# Patient Record
(demographics unavailable — no encounter records)

---

## 2019-02-07 NOTE — RAD
CHEST ONE VIEW:

 

History: 

Cough. 

 

Comparison: 

4-29-17

 

FINDINGS: 

There is some rotation to the left. There is deformity of the proximal left humerus and glenohumeral 
joint which is old. There are some increased pleural and parenchymal opacity changes in the lung base
s bilaterally, some of this may be related to less inspiratory effort on today's study. However, mini
mal early pneumonia, particularly in the right base, is a consideration. The left costophrenic angle 
region appears stable when compared to a prior 1-2-19 CT abdomen and pelvis  film. 

 

IMPRESSION: 

Pleural and parenchymal opacity changes in the bases which appear more prominent than on prior study.
 This is concerning for the possibility of some acute right lower lobe pneumonia and/or atelectasis. 
The left costophrenic angle opacity appears stable from prior CT, 1-2-19.

 

POS: TPC

## 2019-02-07 NOTE — HP
PRIMARY CARE PROVIDER:  Dr. Crispin Powell. 



The patient referred to [facility] by [facility]

for pneumonia. 



CHIEF COMPLAINT:  Miserable.



HISTORY OF PRESENT ILLNESS:  The patient states she has cough, almost continues,

coughing up clear to slightly off color phlegm, shortness of breath, some chills,

and non-documented fever.  No chest pain.  Chest x-ray confirms a complex pneumonia,

bilateral. 



PAST MEDICAL HISTORY:  Hypertension, diabetes mellitus type 2, dyslipidemia,

coronary artery disease, post coronary artery bypass graft greater than 10 years

ago, seizure disorder, post brain abscess with craniotomy maybe 20 years ago. 



ALLERGIES:  CIPRO, CODEINE, AND DILANTIN.  SHE HAS HAD PENICILLIN ONCE AS A CHILD,

IT CAUSED RASH.  SULFA, TEGRETOL, AND VANCOMYCIN.  I SPOKE TO HER ABOUT CEPHALEXIN,

SHE HAS TAKEN THAT WITHOUT DIFFICULTY. 



CURRENT MEDICATIONS:  Aspirin 81 mg a day, Plavix 75 mg a day, Benicar 40 mg a day,

Crestor 40 mg a day, metoprolol 50 mg p.o. b.i.d., phenobarbital 64.8 mg twice a

day, metformin 500 mg once a day, glimepiride 1 mg a day, Januvia 25 mg a day,

Myrbetriq ER 50 mg a day, Travatan eye drops, Tylenol, and torsemide 20 mg a day. 



FAMILY HISTORY:  Father and grandfather  with diabetes and hypertension.



SOCIAL HISTORY:  .  Nonsmoker.  Nondrinker.  DNAR status confirmed with the

patient and Carlos Aguilar, her daughter, who has power of . 



REVIEW OF SYSTEMS:  GENERAL:  No headaches, dizziness, or fainting. 

EYES:  No double vision, blurred vision, or flashing lights. 

EAR, NOSE, AND THROAT:  No ear pain or drainage.  No nasal bleeding.  No trouble

swallowing. 

CARDIAC:  No chest pain, orthopnea, or paroxysmal nocturnal dyspnea. 

RESPIRATIONS:  See present illness.  No asthma history. 

GASTROINTESTINAL:  No nausea, vomiting, diarrhea, or abdominal pain. 

GENITOURINARY:  No hematuria or dysuria. 

MUSCULOSKELETAL:  She states her ankles swell occasionally.  No particular muscle or

joint pains. 

NEUROLOGICAL:  She has no residual weakness, etc, from her brain abscess and

craniotomy. 

PSYCHIATRIC:  No anxiety or depression. 

SKIN:  No bruises.  She has easy bruising.  No rash. 

HEME/LYMPH:  No tender or swollen lymph nodes in the axilla, inguinal, or cervical

area. 



PHYSICAL EXAMINATION:

GENERAL:  She is alert, pleasant, oriented, and cooperative lady. 

VITAL SIGNS:  Blood pressure 168/78, pulse 104, respirations 20, temperature 97, and

pulse ox 95 on room air. 

HEENT:  Examination of her head, eyes, ears, nose, and throat revealed pupils are

equal and round.  Extraocular movements are intact.  Sclerae are white.  Tympanic

membranes clear.  Nose clear.  Oral mucous membranes are wet.  Dental hygiene is

adequate. 

NECK:  Supple without jugular venous distention, adenopathy, or thyromegaly. 

CHEST:  Clear to percussion.  She has rales in the left lower lung field and scant

rales in the mid right chest.  Breath sounds are good without wheezes. 

HEART:  Regular rate and rhythm.  First and second heart sounds clear.  No murmurs

or gallops. 

ABDOMEN:  Soft.  Bowel sounds are normal.  No hepatosplenomegaly.  No mass.  No

rebound. 

EXTREMITIES:  Demonstrate no cyanosis, clubbing, or edema.  Pulses; carotid, radial,

femoral, and dorsalis pedis pulses intact. 

SKIN:  Warm and dry without significant ecchymoses or rash. 

HEME/LYMPH:  No tender or swollen lymph nodes in axilla, inguinal, or cervical area.

 No petechial hemorrhages in her mucous membranes or nail beds. 

NEUROLOGIC:  Cranial nerves II through XII intact.  Deep tendon reflexes symmetric.

Moves all extremities.  Sensations intact. 



IMAGING DATA:  Chest x-ray; no cardiomegaly, bilateral infiltrates, left lateral

lower lobe, and right perihilar area, reviewed by me.  EKG; sinus tachycardia and

minimal ST depression in lateral leads, reviewed by me. 



LABORATORY DATA:  Comprehensive metabolic profile; CO2 of 20, lactic acid 1.7, blood

sugar 350, creatinine 2.05, BUN 45, sodium 138, potassium 4.4, and chloride 106.

Liver function tests normal.  She does have an elevated troponin at 0.16.  White

count 10.5, hemoglobin 10.8, and platelet count 180,000. 



ADMITTING DIAGNOSES:  

1. Pneumonia, complex, bilateral.

2. Diabetes mellitus type 2 with elevated blood sugar and chronic kidney disease

stage 3. 

3. Coronary artery disease.

4. Hypertension.

5. Seizure disorder, controlled.

6. Elevated troponin, most likely due to chronic kidney disease.



PLAN:  

1. Cultures have been obtained.  Rocephin and Zithromax will be given IV daily.

2. Mucinex DM twice a day.

3. Accu-Cheks and sliding scale.  Continue oral hypoglycemic agents.

4. Continue aspirin, Plavix, metoprolol, torsemide, and Benicar.  Expected length of

hospitalization, 3 days. 







Job ID:  [id number]

## 2019-02-08 NOTE — PDOC.PN
- Subjective


Encounter Start Date: 02/08/19


Encounter Start Time: 07:57


Subjective: still has cough, grumpy about being awakened during nite





- Objective


Resuscitation Status - Order Detail:





02/07/19 15:27


Resuscitation Status Routine 


   Resuscitation Status: DNAR: NO Resuscitation


   Discussed with: confirmed with patient and POAcarlos-daughter








MAR Reviewed: Yes


Vital Signs & Weight: 


 Vital Signs (12 hours)











  Temp Pulse Resp BP Pulse Ox


 


 02/08/19 03:50      93 L


 


 02/08/19 02:59  98.3 F  93  16  141/72 H  93 L


 


 02/08/19 02:10   81  16  144/74 H 


 


 02/07/19 20:00  99.0 F  97  18  166/82 H  97








 Weight











Weight                         178 lb 5 oz














I&O: 


 











 02/07/19 02/08/19 02/09/19





 06:59 06:59 06:59


 


Intake Total  810 


 


Balance  810 











Result Diagrams: 


 02/08/19 05:38





 02/08/19 05:38


Additional Labs: 


 Accuchecks











  02/08/19 02/07/19 02/07/19





  05:11 20:58 17:37


 


POC Glucose  152 H  167 H  189 H














Phys Exam





- Physical Examination


Neck: no JVD


rales LLL and R antchest- good BS WO wheezes


Cardiovascular: RRR


Gastrointestinal: soft, positive bowel sounds


Musculoskeletal: no edema





Dx/Plan


(1) PNA (pneumonia)


Code(s): J18.9 - PNEUMONIA, UNSPECIFIED ORGANISM   Status: Acute   


Qualifiers: 


   Laterality: bilateral   Lung location: lower lobe of lung 





(2) DM type 2 causing CKD stage 3


Code(s): E11.22 - TYPE 2 DIABETES MELLITUS W DIABETIC CHRONIC KIDNEY DISEASE; 

N18.3 - CHRONIC KIDNEY DISEASE, STAGE 3 (MODERATE)   Status: Chronic   


Qualifiers: 


   Diabetes mellitus long term insulin use: without long term use   Qualified 

Code(s): E11.22 - Type 2 diabetes mellitus with diabetic chronic kidney disease

; N18.3 - Chronic kidney disease, stage 3 (moderate)   





(3) CAD (coronary artery disease)


Code(s): I25.10 - ATHSCL HEART DISEASE OF NATIVE CORONARY ARTERY W/O ANG PCTRS 

  Status: Acute   


Qualifiers: 


   Coronary Disease-Associated Artery/Lesion type: native artery   Native vs. 

transplanted heart: native heart   Associated angina: without angina   

Qualified Code(s): I25.10 - Atherosclerotic heart disease of native coronary 

artery without angina pectoris   





(4) HTN (hypertension)


Code(s): I10 - ESSENTIAL (PRIMARY) HYPERTENSION   Status: Chronic   


Qualifiers: 


   Hypertension type: essential hypertension   Qualified Code(s): I10 - 

Essential (primary) hypertension   





(5) Elevated troponin I level


Code(s): R74.8 - ABNORMAL LEVELS OF OTHER SERUM ENZYMES   Status: Acute   





- Plan


EKG


-: cont iv antibx


-:  qd


-: cont accu/ss


-: selected home meds





* .

## 2019-02-08 NOTE — EKG
Test Reason : 

Blood Pressure : ***/*** mmHG

Vent. Rate : 098 BPM     Atrial Rate : 098 BPM

   P-R Int : 148 ms          QRS Dur : 098 ms

    QT Int : 352 ms       P-R-T Axes : 006 054 091 degrees

   QTc Int : 449 ms

 

Sinus rhythm with marked sinus arrhythmia

Nonspecific ST and T wave abnormality

Abnormal ECG

When compared with ECG of 08-FEB-2019 08:34, (Unconfirmed)

Sinus rhythm has replaced Atrial fibrillation

Confirmed by DR. REJI TREJO ([id number]) on 2/8/2019 9:07:23 AM

 

Referred By:  SIMONE           Confirmed By:DR. REJI TREJO

## 2019-02-08 NOTE — PDOC.EVN
Event Note





- Event Note


Event Note: 





no chest pain. trop 1.2-1.7. EKG now atrial fib. will order echo, consult 

cardiology

## 2019-02-09 NOTE — CON
DATE OF CONSULTATION:  



HISTORY:  Trian Arthur is an 84-year-old white female, who was admitted with

pneumonia and has had elevated troponin I.  She was seen by Dr. Hart in November 2008 and underwent cardiac catheterization.  This then followed by CABG x3 with 
LIMA

to the LAD, saphenous vein graft to the first obtuse marginal and to the second

obtuse marginal.  The diagonal and right coronary artery were too small for 
bypass.

She did well postoperatively.  She was again admitted in January 2009 for elbow

pain, chest discomfort.  She had negative cardiac enzymes.  She has done fairly 
well

from a cardiac standpoint.  She states she does not follow up with any 
cardiologist.

 In looking at the office record, apparently has never returned to see Dr. Hart. 



She now is admitted with increased cough.  She denies any chest, arm, neck, or 
jaw

discomfort.  She denies any elbow pain. 



PAST MEDICAL HISTORY:  Diabetes, hypercholesterolemia, hypertension, coronary 
artery

disease, seizure disorder, history of brain abscess with craniotomy. 



MEDICATIONS:  

1. Aspirin 81 daily.

2. Plavix 75 daily.

3. Benicar 40 mg daily.

4. Crestor 40 daily.

5. Metoprolol 50 b.i.d.

6. Phenobarbital 64.8 b.i.d.

7. Metformin 500 mg daily.

8. Glimepiride 1 mg daily.

9. Januvia 25 daily.

10. Myrbetriq 50 ER daily.

11. Torsemide 20 daily.



ALLERGIES:  CODEINE, CIPRO, DILANTIN, RASH WITH PENICILLIN, SULFA, TEGRETOL,

VANCOMYCIN. 



SOCIAL HISTORY:  She does not smoke or drink.



FAMILY HISTORY:  Negative for coronary artery disease.



REVIEW OF SYSTEMS:  Review of systems is otherwise unremarkable.



PHYSICAL EXAMINATION:

VITAL SIGNS:  Blood pressure 150/70, pulse of 86. 

HEENT:  PERRL. 

NECK:  Supple. 

CHEST:  Reveals mild expiratory wheezing. 

HEART:  S1 and S2 normal without any S3, S4, or murmurs. 

ABDOMEN:  Obese.  Normal bowel sounds.  No tenderness. 

EXTREMITIES:  Revealed trace pedal edema. 

NEUROLOGICAL:  Grossly intact.



LABORATORY DATA:  Admission EKG revealed normal sinus rhythm with nonspecific 
ST and

T-wave changes.  She has had some episodes of regular supraventricular 
tachycardia

with rate of approximately 140 to 150 per minute.  However, EKG today shows 
definite

atrial fibrillation with rate of 134 per minute.  Hemoglobin 10.2, hematocrit 
31.2,

white count 6800, platelets 181,000.  Sodium 138, potassium 4.2, chloride 108,

carbon dioxide 23, BUN 39, and creatinine 1.67.  Troponin I of 1.715.  BNP 
599.0.

Chest x-ray revealed bilateral lower lobe pneumonia. 



IMPRESSION:  

1. Bilateral pneumonia.

2. Paroxysmal atrial fibrillation.

3. Paroxysmal atrial tachycardia.

4. Status post coronary artery bypass graft x3.  She denies any chest 
discomfort.

5. Elevated troponin I probably due to demand ischemia.

6. Hypertension.

7. Diabetes.

8. Hypercholesterolemia.

9. History of seizure disorder.

10. History of drainage of brain abscess.



PLAN:  At the present time, the patient is back in sinus rhythm.  Echocardiogram

will be performed.  She currently is being anticoagulated with intravenous 
heparin.

She denies any history of stroke and probably Plavix is not needed and 
consideration

could be given to the anticoagulation and baby aspirin.  Also consideration 
will be

given to suppressive antiarrhythmics once her echo has been performed and 
reviewed. 







Job ID:  [id number]



[facility] CenterSANDY

## 2019-02-09 NOTE — PDOC.PN
- Subjective


Encounter Start Date: 02/09/19


Encounter Start Time: 08:20





Pt seen for followup re: pneumonia.  Feels better.





- Objective


Resuscitation Status - Order Detail:





02/07/19 15:27


Resuscitation Status Routine 


   Resuscitation Status: DNAR: NO Resuscitation


   Discussed with: confirmed with patient and carlos RAMIREZ-daughter








MAR Reviewed: Yes


Vital Signs & Weight: 


 Vital Signs (12 hours)











  Temp Pulse Resp BP Pulse Ox


 


 02/09/19 12:35  99 F  96  18  115/54 L  95


 


 02/09/19 08:18     162/70 H 


 


 02/09/19 07:27  98.1 F  88  18  171/75 H  95


 


 02/09/19 07:15      95


 


 02/09/19 04:00  98.1 F  109 H  20  157/73 H  95








 Weight











Admit Weight                   178 lb 5 oz


 


Weight                         178 lb 5 oz














I&O: 


 











 02/08/19 02/09/19 02/10/19





 06:59 06:59 06:59


 


Intake Total 810 1190 


 


Balance 810 1190 











Result Diagrams: 


 02/08/19 16:43





 02/08/19 05:38


Additional Labs: 


 Accuchecks











  02/09/19 02/09/19 02/08/19





  11:23 05:24 20:20


 


POC Glucose  170 H  132 H  134 H














  02/08/19





  16:12


 


POC Glucose  104











EKG Reviewed by me: Yes (Tele:  NSR)





Phys Exam





- Physical Examination


Obese


HEENT: moist MMs, sclera anicteric, oral pharynx no lesions, 2+ tonsils


Neck: no nodes, no JVD, supple, full ROM


Respiratory: clear to auscultation bilateral


Cardiovascular: RRR, no rub


Gastrointestinal: soft, non-tender, no distention, positive bowel sounds


Neurological: moves all 4 limbs


Psychiatric: normal affect, A&O x 3





Dx/Plan


(1) PNA (pneumonia)


Code(s): J18.9 - PNEUMONIA, UNSPECIFIED ORGANISM   Status: Acute   


Qualifiers: 


   Laterality: bilateral   Lung location: lower lobe of lung 


Comment: Improving, continue IV caftriaxone and IV azithromycin   





(2) Demand ischemia


Code(s): I24.8 - OTHER FORMS OF ACUTE ISCHEMIC HEART DISEASE   Status: Acute   

Comment: continue to monitor on telemetry   





(3) Atrial fibrillation


Code(s): I48.91 - UNSPECIFIED ATRIAL FIBRILLATION   Status: Acute   Comment: pt 

converted to sinus rhythm   





(4) Insomnia


Code(s): G47.00 - INSOMNIA, UNSPECIFIED   Status: Acute   Comment: start PRN 

melatonin   





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


Code(s): E11.22 - TYPE 2 DIABETES MELLITUS W DIABETIC CHRONIC KIDNEY DISEASE; 

N18.3 - CHRONIC KIDNEY DISEASE, STAGE 3 (MODERATE)   Status: Chronic   


Qualifiers: 


   Diabetes mellitus long term insulin use: without long term use   Qualified 

Code(s): E11.22 - Type 2 diabetes mellitus with diabetic chronic kidney disease

; N18.3 - Chronic kidney disease, stage 3 (moderate)   


Comment: creatinine improving   





(6) HTN (hypertension)


Code(s): I10 - ESSENTIAL (PRIMARY) HYPERTENSION   Status: Chronic   


Qualifiers: 


   Hypertension type: essential hypertension   Qualified Code(s): I10 - 

Essential (primary) hypertension   


Comment: controlled   





(7) DM2 (diabetes mellitus, type 2)


Status: Chronic   Comment: controlled   





- Plan





* .








Review of Systems





- Review of Systems


Constitutional: weakness.  negative: fever, chills, sweats, malaise


Respiratory: Cough, SOB with Excertion, Sputum.  negative: Dry, Shortness of 

Breath, Hemoptysis, Pleuritic Pain, Wheezing


Cardiovascular: negative: chest pain, palpitations, orthopnea, paroxysmal 

nocturnal dyspnea, edema, light headedness


Gastrointestinal: negative: Nausea, Vomiting, Abdominal Pain, Diarrhea, 

Constipation, Melena, Hematochezia


Genitourinary: negative: Dysuria, Frequency, Incontinence, Hematuria, Retention


Skin: negative: Rash, Lesions, James, Bruising





- Medications/Allergies


Allergies/Adverse Reactions: 


 Allergies











Allergy/AdvReac Type Severity Reaction Status Date / Time


 


codeine Allergy Intermediate  Verified 02/07/19 16:57


 


Penicillins Allergy Intermediate  Verified 02/07/19 16:57


 


Sulfa (Sulfonamide Allergy Intermediate  Verified 02/07/19 16:57





Antibiotics)     


 


ciprofloxacin [From Cipro] Allergy   Verified 02/07/19 16:57


 


phenytoin [From Dilantin] Allergy   Verified 02/07/19 16:57


 


vancomycin Allergy  Hives Verified 02/07/19 16:57











Medications: 


 Current Medications





Acetaminophen (Tylenol)  650 mg PO Q4H PRN


   PRN Reason: Headache/Fever/Mild Pain (1-3)


   Last Admin: 02/07/19 20:17 Dose:  650 mg


Aspirin (Aspirin Chewable)  81 mg PO DAILY [facility]


   Last Admin: 02/09/19 09:57 Dose:  81 mg


Dextrose/Water (Dextrose 50%)  25 gm SLOW IVP PRN PRN


   PRN Reason: Hypoglycemia


Escitalopram Oxalate (Lexapro)  10 mg PO DAILY [facility]


   Last Admin: 02/09/19 09:42 Dose:  10 mg


Glucagon (Glucagon)  1 mg IM PRN PRN


   PRN Reason: Hypoglycemia


Glyburide (Diabeta)  5 mg PO BID-[facility]


   Last Admin: 02/09/19 09:42 Dose:  5 mg


Guaifenesin/Dextromethorphan (Mucinex Dm)  1 tab PO Q12HR [facility]


   Last Admin: 02/09/19 09:42 Dose:  1 tab


Heparin Sodium (Porcine) (Heparin 1,000 Units/Ml (10 Ml))  0 units SLOW IVP 

ASDIR [facility]; Protocol


   Last Admin: 02/09/19 10:35 Dose:  4,000 unit


Azithromycin 500 mg/ Sodium (Chloride)  250 mls @ 250 mls/hr IVPB 1500 [facility]


   Last Admin: 02/08/19 15:52 Dose:  250 mls


Ceftriaxone Sodium 1 gm/ (Sodium Chloride)  100 mls @ 200 mls/hr IVPB Q24HR@

1400 [facility]


   Last Admin: 02/08/19 13:17 Dose:  100 mls


Dextrose/Water (D5w)  1,000 mls @ 0 mls/hr IV .Q0M PRN


   PRN Reason: Hypoglycemia


Heparin Sodium/Dextrose (Heparin 25,000 Units/D5w 500 Ml)  500 mls @ 0 mls/hr 

IVPB INF [facility]; Protocol


   Last Admin: 02/08/19 20:18 Dose:  500 mls


Insulin Human Lispro (Humalog)  0 units SC .MILD SLIDING SCALE PRN


   PRN Reason: Mild Correctional Scale


Melatonin (Melatonin)  3 mg PO HSPRN PRN


   PRN Reason: Insomnia


Met[facility] (Glucophage)  [facility]


   Last Admin: 02/09/19 09:42 Dose:  1,000 mg


Metoprolol Tartrate (Lopressor)  100 mg PO DAILY [facility]


   Last Admin: 02/09/19 09:42 Dose:  100 mg


Nitroglycerin (Nitrostat)  0.4 mg SL Q5MIN PRN


   PRN Reason: Chest Pain


Olmesartan (Benicar)  40 mg PO DAILY [facility]


   Last Admin: 02/09/19 09:43 Dose:  40 mg


Ondansetron HCl (Zofran Odt)  4 mg PO Q6H PRN


   PRN Reason: Nausea/Vomiting


Phenobarbital (Phenobarbital)  64.8 mg PO BID [facility]


   Last Admin: 02/09/19 09:43 Dose:  64.8 mg


Rosuvastatin Calcium (Crestor)  40 mg PO DAILY [facility]


   Last Admin: 02/09/19 09:57 Dose:  40 mg


Sodium Chloride (Flush - Normal Saline)  10 ml IVF Q12HR [facility]


   Last Admin: 02/09/19 09:28 Dose:  Not Given


Sodium Chloride (Flush - Normal Saline)  10 ml IVF PRN PRN


   PRN Reason: Saline Flush


Torsemide (Demadex)  5 mg PO 0900,1400 [facility]


   Last Admin: 02/09/19 09:43 Dose:  5 mg


Trospium (Trospium)  20 mg PO DAILY [facility]


   Last Admin: 02/09/19 09:44 Dose:  20 mg


Zolpidem Tartrate (Ambien)  5 mg PO HSPRN PRN


   PRN Reason: Insomnia

## 2019-02-10 NOTE — PDOC.PN
- Subjective


Encounter Start Date: 02/10/19


Encounter Start Time: 08:00





Pt seen for followup re: pneumonia.  Cough+, sputum+.





- Objective


Resuscitation Status - Order Detail:





02/07/19 15:27


Resuscitation Status Routine 


   Resuscitation Status: DNAR: NO Resuscitation


   Discussed with: confirmed with patient and carlos RAMIREZ-daughter








MAR Reviewed: Yes


Vital Signs & Weight: 


 Vital Signs (12 hours)











  Temp Pulse Resp BP Pulse Ox


 


 02/10/19 11:40  97.6 F  88  18  126/66  94 L


 


 02/10/19 07:22      95


 


 02/10/19 07:20  97.6 F  95  20  123/58 L  93 L


 


 02/10/19 04:00  98.0 F  70  18  126/60  93 L








 Weight











Admit Weight                   178 lb 5 oz


 


Weight                         178 lb 5 oz














I&O: 


 











 02/09/19 02/10/19 02/11/19





 06:59 06:59 06:59


 


Intake Total 1190 1300 


 


Output Total  750 


 


Balance 1190 550 











Result Diagrams: 


 02/10/19 04:09





 02/10/19 04:09


Additional Labs: 


 Accuchecks











  02/10/19 02/10/19 02/09/19





  11:01 05:38 20:08


 


POC Glucose  208 H  102  110














  02/09/19





  16:43


 


POC Glucose  105











EKG Reviewed by me: Yes (Tele:  opal marie)





Phys Exam





- Physical Examination


Obese


HEENT: moist MMs


Neck: supple


Respiratory: clear to auscultation bilateral


Cardiovascular: irregular


Gastrointestinal: soft


Neurological: moves all 4 limbs


Psychiatric: normal affect


Skin: no rash





Dx/Plan


(1) PNA (pneumonia)


Code(s): J18.9 - PNEUMONIA, UNSPECIFIED ORGANISM   Status: Acute   


Qualifiers: 


   Laterality: bilateral   Lung location: lower lobe of lung 


Comment: continue IV ceftriaxone and IV azithromycin   





(2) Demand ischemia


Code(s): I24.8 - OTHER FORMS OF ACUTE ISCHEMIC HEART DISEASE   Status: Acute   

Comment: continue to monitor on telemetry   





(3) Atrial fibrillation


Code(s): I48.91 - UNSPECIFIED ATRIAL FIBRILLATION   Status: Acute   Comment: pt 

converted to atrial flutter overnight.  On apixaban and low-dose aspirin.  Also 

on Multaq.   





(4) Insomnia


Code(s): G47.00 - INSOMNIA, UNSPECIFIED   Status: Acute   Comment: continue PRN 

melatonin   





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


Code(s): E11.22 - TYPE 2 DIABETES MELLITUS W DIABETIC CHRONIC KIDNEY DISEASE; 

N18.3 - CHRONIC KIDNEY DISEASE, STAGE 3 (MODERATE)   Status: Chronic   


Qualifiers: 


   Diabetes mellitus long term insulin use: without long term use   Qualified 

Code(s): E11.22 - Type 2 diabetes mellitus with diabetic chronic kidney disease

; N18.3 - Chronic kidney disease, stage 3 (moderate)   


Comment: creatinine 1.81 today   





(6) HTN (hypertension)


Code(s): I10 - ESSENTIAL (PRIMARY) HYPERTENSION   Status: Chronic   


Qualifiers: 


   Hypertension type: essential hypertension   Qualified Code(s): I10 - 

Essential (primary) hypertension   


Comment: controlled   





(7) DM2 (diabetes mellitus, type 2)


Status: Chronic   Comment: controlled   





- Plan





* .








Review of Systems





- Review of Systems


Respiratory: Cough, Sputum.  negative: Dry, Shortness of Breath, Hemoptysis, 

SOB with Excertion, Pleuritic Pain, Wheezing


Cardiovascular: negative: chest pain, palpitations, orthopnea, paroxysmal 

nocturnal dyspnea, edema, light headedness





- Medications/Allergies


Allergies/Adverse Reactions: 


 Allergies











Allergy/AdvReac Type Severity Reaction Status Date / Time


 


codeine Allergy Intermediate  Verified 02/07/19 16:57


 


Penicillins Allergy Intermediate  Verified 02/07/19 16:57


 


Sulfa (Sulfonamide Allergy Intermediate  Verified 02/07/19 16:57





Antibiotics)     


 


ciprofloxacin [From Cipro] Allergy   Verified 02/07/19 16:57


 


phenytoin [From Dilantin] Allergy   Verified 02/07/19 16:57


 


vancomycin Allergy  Hives Verified 02/07/19 16:57











Medications: 


 Current Medications





Acetaminophen (Tylenol)  650 mg PO Q4H PRN


   PRN Reason: Headache/Fever/Mild Pain (1-3)


   Last Admin: 02/07/19 20:17 Dose:  650 mg


Apixaban (Eliquis)  2.5 mg PO BID [facility]


   Last Admin: 02/10/19 10:08 Dose:  2.5 mg


Aspirin (Aspirin Chewable)  81 mg PO DAILY [facility]


   Last Admin: 02/10/19 08:36 Dose:  81 mg


Dextrose/Water (Dextrose 50%)  25 gm SLOW IVP PRN PRN


   PRN Reason: Hypoglycemia


Dronedarone (Multaq)  400 mg PO BID-[facility]


   Last Admin: 02/10/19 08:35 Dose:  400 mg


Escitalopram Oxalate (Lexapro)  10 mg PO DAILY [facility]


   Last Admin: 02/10/19 08:36 Dose:  10 mg


Glucagon (Glucagon)  1 mg IM PRN PRN


   PRN Reason: Hypoglycemia


Glyburide (Diabeta)  5 mg PO BID-[facility]


   Last Admin: 02/10/19 08:36 Dose:  5 mg


Guaifenesin/Dextromethorphan (Mucinex Dm)  1 tab PO Q12HR [facility]


   Last Admin: 02/10/19 08:36 Dose:  1 tab


Azithromycin 500 mg/ Sodium (Chloride)  250 mls @ 250 mls/hr IVPB 1500 [facility]


   Last Admin: 02/09/19 16:40 Dose:  250 mls


Ceftriaxone Sodium 1 gm/ (Sodium Chloride)  100 mls @ 200 mls/hr IVPB Q24HR@

1400 [facility]


   Last Admin: 02/09/19 15:58 Dose:  100 mls


Dextrose/Water (D5w)  1,000 mls @ 0 mls/hr IV .Q0M PRN


   PRN Reason: Hypoglycemia


Insulin Human Lispro (Humalog)  0 units SC .MILD SLIDING SCALE PRN


   PRN Reason: Mild Correctional Scale


Melatonin (Melatonin)  3 mg PO HSPRN PRN


   PRN Reason: Insomnia


   Last Admin: 02/09/19 20:43 Dose:  3 mg


[facility]  [facility]


   Last Admin: 02/10/19 08:36 Dose:  1,000 mg


Metoprolol Tartrate (Lopressor)  100 mg PO DAILY [facility]


   Last Admin: 02/10/19 08:37 Dose:  100 mg


Nitroglycerin (Nitrostat)  0.4 mg SL Q5MIN PRN


   PRN Reason: Chest Pain


Olmesartan (Benicar)  40 mg PO DAILY [facility]


   Last Admin: 02/10/19 08:37 Dose:  40 mg


Ondansetron HCl (Zofran Odt)  4 mg PO Q6H PRN


   PRN Reason: Nausea/Vomiting


   Last Admin: 02/09/19 20:42 Dose:  4 mg


Phenobarbital (Phenobarbital)  64.8 mg PO BID [facility]


   Last Admin: 02/10/19 08:37 Dose:  64.8 mg


Rosuvastatin Calcium (Crestor)  40 mg PO DAILY [facility]


   Last Admin: 02/10/19 08:37 Dose:  40 mg


Sodium Chloride (Flush - Normal Saline)  10 ml IVF Q12HR [facility]


   Last Admin: 02/10/19 08:38 Dose:  10 ml


Sodium Chloride (Flush - Normal Saline)  10 ml IVF PRN PRN


   PRN Reason: Saline Flush


Torsemide (Demadex)  5 mg PO 0900,1400 [facility]


   Last Admin: 02/10/19 08:38 Dose:  5 mg


Trospium (Trospium)  20 mg PO DAILY [facility]


   Last Admin: 02/10/19 08:38 Dose:  20 mg


Zolpidem Tartrate (Ambien)  5 mg PO HSPRN PRN


   PRN Reason: Insomnia

## 2019-02-11 NOTE — RAD
CHEST 2 VIEWS:

 

HISTORY: 

Evaluate for pneumonia.

 

COMPARISON: 

2/7/2019.

 

FINDINGS: 

Sternotomy wires are noted.  There is atherosclerosis of the aorta.  Heart size is upper normal.  Pul
monary vessels and hilum are normal.  Costophrenic angles are clear.  Chronic changes of the lung par
enchyma.  Improved aeration of the lung bases.  No pneumothorax or acute osseous abnormalities.

 

IMPRESSION: 

1.  Atherosclerosis.

 

2.  Chronic change of the lung parenchyma.

 

POS: [facility]

## 2019-02-11 NOTE — PDOC.CTH
Cardiology Progress Note





- Subjective





Pt. seen and eval. by me. No new events over night. She is maintaining NSR.





- Objective


 Vital Signs











  Temp Pulse Pulse Pulse Resp BP BP


 


 02/11/19 16:46       


 


 02/11/19 15:44   91     


 


 02/11/19 15:24  98.9 F  84    16  


 


 02/11/19 14:20    81  82   168/75 H  187/84 H


 


 02/11/19 11:46  98.7 F  91    16  


 


 02/11/19 09:22   92     


 


 02/11/19 08:54       


 


 02/11/19 08:52   86     


 


 02/11/19 07:26  97.8 F  86    20  














  BP Pulse Ox


 


 02/11/19 16:46  147/66 H 


 


 02/11/19 15:44  


 


 02/11/19 15:24  199/86 H  90 L


 


 02/11/19 14:20  


 


 02/11/19 11:46  158/96 H  96


 


 02/11/19 09:22  181/74 H 


 


 02/11/19 08:54   97


 


 02/11/19 08:52  


 


 02/11/19 07:26  207/90 H  97








 











Admit Weight                   178 lb 5 oz


 


Weight                         181 lb 3.2 oz














 











 02/10/19 02/11/19 02/12/19





 06:59 06:59 06:59


 


Intake Total 1300 1320 


 


Output Total 750 850 


 


Balance 550 470 














- Physical Examination


General/Neuro: alert & oriented x3


Neck: carotid US brisk


Lungs: CTA


Heart: RRR


Abdomen: NT/ND, soft





- Labs


Result Diagrams: 


 02/11/19 05:01





 02/11/19 05:01


 Troponin/CKMB











CK-MB (CK-2)  2.0 ng/mL (0-6.6)   02/07/19  12:55    


 


Troponin I  0.660 ng/mL (< 0.028)  H*  02/09/19  02:48    














- Assessment/Plan





1. SVT: Afib. continue betablockers and Multaq. 


2. CAD, s/p CABG: stable.


3. DM: hypoglycemic earlier. oterwise reasonable control.


4. HTN: stable.Continue present meds.


5. Dyslipidemia: resume statins.


6. renal insufficiency: per primary or renal.


7. Seizure disorder.


8. Anemia. Likely multifactorial.





echo: EF 50-55%,diastolic dysfunction. Mild MR,TR,PI





Review of Systems





- Review of Systems


EENTM: reports: no symptoms reported


Respiratory: reports: cough


Cardiac (ROS): reports: no symptoms reported


ABD/GI: reports: no symptoms reported


: reports: no symptoms reported


Musculoskeletal: reports: no symptoms reported


Skin: reports: no symptoms reported


Neurological: reports: no symptoms reported

## 2019-02-11 NOTE — PDOC.PN
- Subjective


Encounter Start Date: 02/11/19


Encounter Start Time: 07:20





Pt seen for followup re: pneumonia.  Feels weak.





- Objective


Resuscitation Status - Order Detail:





02/07/19 15:27


Resuscitation Status Routine 


   Resuscitation Status: DNAR: NO Resuscitation


   Discussed with: confirmed with patient and carlos RAMIREZ-daughter








MAR Reviewed: Yes


Vital Signs & Weight: 


 Vital Signs (12 hours)











  Temp Pulse Pulse Pulse Resp BP BP


 


 02/11/19 16:46       


 


 02/11/19 15:44   91     


 


 02/11/19 15:24  98.9 F  84    16  


 


 02/11/19 14:20    81  82   168/75 H  187/84 H


 


 02/11/19 11:46  98.7 F  91    16  


 


 02/11/19 09:22   92     


 


 02/11/19 08:54       


 


 02/11/19 08:52   86     


 


 02/11/19 07:26  97.8 F  86    20  














  BP Pulse Ox


 


 02/11/19 16:46  147/66 H 


 


 02/11/19 15:44  


 


 02/11/19 15:24  199/86 H  90 L


 


 02/11/19 14:20  


 


 02/11/19 11:46  158/96 H  96


 


 02/11/19 09:22  181/74 H 


 


 02/11/19 08:54   97


 


 02/11/19 08:52  


 


 02/11/19 07:26  207/90 H  97








 Weight











Admit Weight                   178 lb 5 oz


 


Weight                         181 lb 3.2 oz














I&O: 


 











 02/10/19 02/11/19 02/12/19





 06:59 06:59 06:59


 


Intake Total 1300 1320 720


 


Output Total 


 


Balance 550 470 -280











Result Diagrams: 


 02/11/19 05:01





 02/11/19 05:01


Additional Labs: 


 Accuchecks











  02/11/19 02/11/19 02/11/19





  17:57 10:39 06:26


 


POC Glucose  60 L  103  90














  02/11/19 02/10/19





  06:00 20:19


 


POC Glucose  58 L*  107











EKG Reviewed by me: Yes (Tele:  NSR)





Phys Exam





- Physical Examination


Obese


HEENT: moist MMs


Neck: supple


Respiratory: clear to auscultation bilateral


Cardiovascular: RRR


Gastrointestinal: soft


Neurological: moves all 4 limbs


Psychiatric: normal affect





Dx/Plan


(1) PNA (pneumonia)


Code(s): J18.9 - PNEUMONIA, UNSPECIFIED ORGANISM   Status: Acute   


Qualifiers: 


   Laterality: bilateral   Lung location: lower lobe of lung 


Comment: Improving, on IV ceftriaxone and IV azithromycin   





(2) Demand ischemia


Code(s): I24.8 - OTHER FORMS OF ACUTE ISCHEMIC HEART DISEASE   Status: Acute   

Comment: likely secondary to pneumonia   





(3) Insomnia


Code(s): G47.00 - INSOMNIA, UNSPECIFIED   Status: Acute   Comment: continue PRN 

melatonin   





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


Code(s): E11.22 - TYPE 2 DIABETES MELLITUS W DIABETIC CHRONIC KIDNEY DISEASE; 

N18.3 - CHRONIC KIDNEY DISEASE, STAGE 3 (MODERATE)   Status: Chronic   


Qualifiers: 


   Diabetes mellitus long term insulin use: without long term use   Qualified 

Code(s): E11.22 - Type 2 diabetes mellitus with diabetic chronic kidney disease

; N18.3 - Chronic kidney disease, stage 3 (moderate)   


Comment: creatinine 1.81 today   





(5) HTN (hypertension)


Code(s): I10 - ESSENTIAL (PRIMARY) HYPERTENSION   Status: Chronic   


Qualifiers: 


   Hypertension type: essential hypertension   Qualified Code(s): I10 - 

Essential (primary) hypertension   


Comment: controlled   





(6) DM2 (diabetes mellitus, type 2)


Status: Chronic   Comment: Hypoglycemic episodes, continue hypoglycemia 

protocol.  Will discontinue short-acting insulin and follow accuchecks.   





(7) Atrial fibrillation


Code(s): I48.91 - UNSPECIFIED ATRIAL FIBRILLATION   Status: Resolved   Comment: 

Pt converted to NSR.   





- Plan





* .








Review of Systems





- Review of Systems


Constitutional: weakness


Respiratory: Cough, Sputum.  negative: Dry, Shortness of Breath, Hemoptysis, 

SOB with Excertion, Pleuritic Pain, Wheezing


Cardiovascular: negative: chest pain, palpitations, orthopnea, paroxysmal 

nocturnal dyspnea, edema, light headedness





- Medications/Allergies


Allergies/Adverse Reactions: 


 Allergies











Allergy/AdvReac Type Severity Reaction Status Date / Time


 


codeine Allergy Intermediate  Verified 02/07/19 16:57


 


Penicillins Allergy Intermediate  Verified 02/07/19 16:57


 


Sulfa (Sulfonamide Allergy Intermediate  Verified 02/07/19 16:57





Antibiotics)     


 


ciprofloxacin [From Cipro] Allergy   Verified 02/07/19 16:57


 


phenytoin [From Dilantin] Allergy   Verified 02/07/19 16:57


 


vancomycin Allergy  Hives Verified 02/07/19 16:57











Medications: 


 Current Medications





Acetaminophen (Tylenol)  650 mg PO Q4H PRN


   PRN Reason: Headache/Fever/Mild Pain (1-3)


   Last Admin: 02/07/19 20:17 Dose:  650 mg


Apixaban (Eliquis)  2.5 mg PO BID Counts include 234 beds at the [facility]


   Last Admin: 02/11/19 08:54 Dose:  2.5 mg


Aspirin (Aspirin Chewable)  81 mg PO DAILY Counts include 234 beds at the [facility]


   Last Admin: 02/11/19 08:54 Dose:  81 mg


Azithromycin (Zithromax)  500 mg PO DAILY Counts include 234 beds at the [facility]


   Last Admin: 02/11/19 08:54 Dose:  500 mg


Dextrose/Water (Dextrose 50%)  25 gm SLOW IVP PRN PRN


   PRN Reason: Hypoglycemia


Digoxin (Lanoxin)  0.125 mg PO DAILY Counts include 234 beds at the [facility]


   Last Admin: 02/11/19 08:53 Dose:  0.125 mg


Dronedarone (Multaq)  400 mg PO BID-[facility]


   Last Admin: 02/11/19 16:45 Dose:  400 mg


Escitalopram Oxalate (Lexapro)  10 mg PO DAILY Counts include 234 beds at the [facility]


   Last Admin: 02/11/19 08:54 Dose:  10 mg


Glucagon (Glucagon)  1 mg IM PRN PRN


   PRN Reason: Hypoglycemia


Glyburide (Diabeta)  5 mg PO BID-[facility]


   Last Admin: 02/11/19 16:45 Dose:  5 mg


Guaifenesin (Mucinex)  600 mg PO Q12HR Counts include 234 beds at the [facility]


Guaifenesin/Dextromethorphan (Mucinex Dm)  1 tab PO Q12HR Counts include 234 beds at the [facility]


   Last Admin: 02/11/19 08:53 Dose:  1 tab


Hydralazine HCl (Apresoline)  10 mg SLOW IVP Q6H PRN


   PRN Reason: SBP Greater Than 180


   Last Admin: 02/11/19 15:44 Dose:  10 mg


Ceftriaxone Sodium 1 gm/ (Sodium Chloride)  100 mls @ 200 mls/hr IVPB Q24HR@

1400 Counts include 234 beds at the [facility]


   Last Admin: 02/11/19 13:45 Dose:  100 mls


Dextrose/Water (D5w)  1,000 mls @ 0 mls/hr IV .Q0M PRN


   PRN Reason: Hypoglycemia


Insulin Human Lispro (Humalog)  0 units SC .MILD SLIDING SCALE PRN


   PRN Reason: Mild Correctional Scale


Melatonin (Melatonin)  3 mg PO HSPRN PRN


   PRN Reason: Insomnia


   Last Admin: 02/09/19 20:43 Dose:  3 mg


Metformin HCl (Glucophage)  1,000 mg PO BID-[facility]


   Last Admin: 02/11/19 16:45 Dose:  1,000 mg


Metoprolol Tartrate (Lopressor)  50 mg PO BID Counts include 234 beds at the [facility]


Nitroglycerin (Nitrostat)  0.4 mg SL Q5MIN PRN


   PRN Reason: Chest Pain


Olmesartan (Benicar)  40 mg PO DAILY Counts include 234 beds at the [facility]


   Last Admin: 02/11/19 08:53 Dose:  40 mg


Ondansetron HCl (Zofran Odt)  4 mg PO Q6H PRN


   PRN Reason: Nausea/Vomiting


   Last Admin: 02/09/19 20:42 Dose:  4 mg


Phenobarbital (Phenobarbital)  64.8 mg PO BID Counts include 234 beds at the [facility]


   Last Admin: 02/11/19 09:22 Dose:  64.8 mg


Rosuvastatin Calcium (Crestor)  40 mg PO DAILY Counts include 234 beds at the [facility]


   Last Admin: 02/11/19 08:53 Dose:  40 mg


Sodium Chloride (Flush - Normal Saline)  10 ml IVF Q12HR Counts include 234 beds at the [facility]


   Last Admin: 02/11/19 08:54 Dose:  10 ml


Sodium Chloride (Flush - Normal Saline)  10 ml IVF PRN PRN


   PRN Reason: Saline Flush


Torsemide (Demadex)  5 mg PO 0900,1400 Counts include 234 beds at the [facility]


   Last Admin: 02/11/19 13:45 Dose:  5 mg


Trospium (Trospium)  20 mg PO DAILY Counts include 234 beds at the [facility]


   Last Admin: 02/11/19 08:54 Dose:  20 mg


Zolpidem Tartrate (Ambien)  5 mg PO HSPRN PRN


   PRN Reason: Insomnia

## 2019-02-12 NOTE — PDOC.PN
- Subjective


Encounter Start Date: 02/12/19


Encounter Start Time: 08:40





Pt seen for followup re: pneumonia.  Feels better today.  Cough+.





- Objective


Resuscitation Status - Order Detail:





02/07/19 15:27


Resuscitation Status Routine 


   Resuscitation Status: DNAR: NO Resuscitation


   Discussed with: confirmed with patient and POAcarlos-daughter








MAR Reviewed: Yes


Vital Signs & Weight: 


 Vital Signs (12 hours)











  Temp Pulse Resp BP Pulse Ox


 


 02/12/19 11:10   79   173/74 H 


 


 02/12/19 09:45   81   


 


 02/12/19 08:00  98.0 F  82  16  186/74 H  95


 


 02/12/19 04:00  97.6 F  78  20  157/71 H  94 L








 Weight











Admit Weight                   178 lb 5 oz


 


Weight                         181 lb 8 oz














I&O: 


 











 02/11/19 02/12/19 02/13/19





 06:59 06:59 06:59


 


Intake Total 1320 720 


 


Output Total 850 1000 


 


Balance 470 -280 











Result Diagrams: 


 02/12/19 05:31





 02/12/19 05:31


Additional Labs: 


 Accuchecks











  02/12/19 02/12/19 02/12/19





  10:34 06:21 05:55


 


POC Glucose  131 H  75  49 L*














  02/11/19 02/11/19 02/11/19





  20:25 18:38 17:57


 


POC Glucose  82  80  60 L











EKG Reviewed by me: Yes (Tele;  NSR)





Phys Exam





- Physical Examination


Obesity


HEENT: moist MMs


Neck: supple


Respiratory: clear to auscultation bilateral


Cardiovascular: RRR


Gastrointestinal: soft


Neurological: moves all 4 limbs


Psychiatric: normal affect





Dx/Plan


(1) PNA (pneumonia)


Code(s): J18.9 - PNEUMONIA, UNSPECIFIED ORGANISM   Status: Acute   


Qualifiers: 


   Laterality: bilateral   Lung location: lower lobe of lung 


Comment: continue IV ceftriaxone and IV azithromycin   





(2) Physical deconditioning


Code(s): R53.81 - OTHER MALAISE   Status: Acute   Comment: therapy following   





(3) Demand ischemia


Code(s): I24.8 - OTHER FORMS OF ACUTE ISCHEMIC HEART DISEASE   Status: Acute   

Comment: No chest pain   





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


Code(s): E11.22 - TYPE 2 DIABETES MELLITUS W DIABETIC CHRONIC KIDNEY DISEASE; 

N18.3 - CHRONIC KIDNEY DISEASE, STAGE 3 (MODERATE)   Status: Chronic   


Qualifiers: 


   Diabetes mellitus long term insulin use: without long term use   Qualified 

Code(s): E11.22 - Type 2 diabetes mellitus with diabetic chronic kidney disease

; N18.3 - Chronic kidney disease, stage 3 (moderate)   


Comment: creatinine 1.95 today   





(5) HTN (hypertension)


Code(s): I10 - ESSENTIAL (PRIMARY) HYPERTENSION   Status: Chronic   


Qualifiers: 


   Hypertension type: essential hypertension   Qualified Code(s): I10 - 

Essential (primary) hypertension   


Comment: controlled   





(6) DM2 (diabetes mellitus, type 2)


Status: Chronic   Comment: continue accuchecks, discontinue glyburide   





(7) Atrial fibrillation


Code(s): I48.91 - UNSPECIFIED ATRIAL FIBRILLATION   Status: Resolved   





(8) Insomnia


Code(s): G47.00 - INSOMNIA, UNSPECIFIED   Status: Resolved   





- Plan


plan discussed w/ family, continue antibiotics, PT/OT, out of bed/ambulate





* .








Review of Systems





- Review of Systems


Respiratory: Cough, Sputum.  negative: Dry, Shortness of Breath, Hemoptysis, 

SOB with Excertion, Pleuritic Pain, Wheezing


Cardiovascular: negative: chest pain, palpitations, orthopnea, paroxysmal 

nocturnal dyspnea, edema, light headedness





- Medications/Allergies


Allergies/Adverse Reactions: 


 Allergies











Allergy/AdvReac Type Severity Reaction Status Date / Time


 


codeine Allergy Intermediate  Verified 02/07/19 16:57


 


Penicillins Allergy Intermediate  Verified 02/07/19 16:57


 


Sulfa (Sulfonamide Allergy Intermediate  Verified 02/07/19 16:57





Antibiotics)     


 


ciprofloxacin [From Cipro] Allergy   Verified 02/07/19 16:57


 


phenytoin [From Dilantin] Allergy   Verified 02/07/19 16:57


 


vancomycin Allergy  Hives Verified 02/07/19 16:57











Medications: 


 Current Medications





Acetaminophen (Tylenol)  650 mg PO Q4H PRN


   PRN Reason: Headache/Fever/Mild Pain (1-3)


   Last Admin: 02/07/19 20:17 Dose:  650 mg


Apixaban (Eliquis)  2.5 mg PO BID [facility]


   Last Admin: 02/12/19 09:49 Dose:  2.5 mg


Aspirin (Aspirin Chewable)  81 mg PO DAILY [facility]


   Last Admin: 02/12/19 09:49 Dose:  81 mg


Azithromycin (Zithromax)  500 mg PO DAILY [facility]


   Last Admin: 02/12/19 09:43 Dose:  500 mg


Dextrose/Water (Dextrose 50%)  25 gm SLOW IVP PRN PRN


   PRN Reason: Hypoglycemia


Digoxin (Lanoxin)  0.125 mg PO DAILY [facility]


   Last Admin: 02/12/19 09:45 Dose:  0.125 mg


Dronedarone (Multaq)  400 mg PO BID-[facility]


   Last Admin: 02/12/19 09:44 Dose:  400 mg


Escitalopram Oxalate (Lexapro)  10 mg PO DAILY [facility]


   Last Admin: 02/12/19 09:45 Dose:  10 mg


Glucagon (Glucagon)  1 mg IM PRN PRN


   PRN Reason: Hypoglycemia


Guaifenesin (Mucinex)  600 mg PO Q12HR [facility]


   Last Admin: 02/12/19 09:48 Dose:  600 mg


Hydralazine HCl (Apresoline)  10 mg SLOW IVP Q6H PRN


   PRN Reason: SBP Greater Than 180


   Last Admin: 02/11/19 15:44 Dose:  10 mg


Ceftriaxone Sodium 1 gm/ (Sodium Chloride)  100 mls @ 200 mls/hr IVPB Q24HR@

1400 [facility]


   Last Admin: 02/12/19 14:36 Dose:  100 mls


Dextrose/Water (D5w)  1,000 mls @ 0 mls/hr IV .Q0M PRN


   PRN Reason: Hypoglycemia


Melatonin (Melatonin)  3 mg PO HSPRN PRN


   PRN Reason: Insomnia


   Last Admin: 02/09/19 20:43 Dose:  3 mg


[facility]  [facility]


   Last Admin: 02/12/19 09:44 Dose:  1,000 mg


Metoprolol Tartrate (Lopressor)  75 mg PO BID [facility]


Nitroglycerin (Nitrostat)  0.4 mg SL Q5MIN PRN


   PRN Reason: Chest Pain


Nystatin (Mycostatin Powder)  0 gm TOP BID [facility]


   Last Admin: 02/12/19 14:39 Dose:  1 applic


Olmesartan (Benicar)  40 mg PO DAILY [facility]


   Last Admin: 02/12/19 09:45 Dose:  40 mg


Ondansetron HCl (Zofran Odt)  4 mg PO Q6H PRN


   PRN Reason: Nausea/Vomiting


   Last Admin: 02/09/19 20:42 Dose:  4 mg


Phenobarbital (Phenobarbital)  64.8 mg PO BID [facility]


   Last Admin: 02/12/19 10:39 Dose:  64.8 mg


Rosuvastatin Calcium (Crestor)  40 mg PO DAILY [facility]


   Last Admin: 02/12/19 09:48 Dose:  40 mg


Sodium Chloride (Flush - Normal Saline)  10 ml IVF Q12HR [facility]


   Last Admin: 02/12/19 09:42 Dose:  10 ml


Sodium Chloride (Flush - Normal Saline)  10 ml IVF PRN PRN


   PRN Reason: Saline Flush


   Last Admin: 02/12/19 14:37 Dose:  10 ml


Torsemide (Demadex)  5 mg PO 0900,1400 [facility]


   Last Admin: 02/12/19 14:38 Dose:  5 mg


Trospium (Trospium)  20 mg PO DAILY [facility]


   Last Admin: 02/12/19 09:48 Dose:  20 mg


Zolpidem Tartrate (Ambien)  5 mg PO HSPRN PRN


   PRN Reason: Insomnia

## 2019-02-12 NOTE — PDOC.CTH
Cardiology Progress Note





- Subjective





The pt seen and examined.  No overnight events.  No cardiac complaints.  





- Objective


 Vital Signs











  Temp Pulse Resp BP Pulse Ox


 


 02/12/19 11:10   79   173/74 H 


 


 02/12/19 09:45   81   


 


 02/12/19 08:00  98.0 F  82  16  186/74 H  82 L


 


 02/12/19 04:00  97.6 F  78  20  157/71 H  94 L








 











Admit Weight                   178 lb 5 oz


 


Weight                         181 lb 8 oz














 











 02/11/19 02/12/19 02/13/19





 06:59 06:59 06:59


 


Intake Total 1320 720 


 


Output Total 850 1000 


 


Balance 470 -280 














- Physical Examination


General/Neuro: alert & oriented x3


Neck: no JVD present


Lungs: CTA (diminihsed at bases)


Heart: RRR


Abdomen: soft


Extremities: other: (No edema)





- Telemetry


Telemetry Rhythm: SR





- Labs


Result Diagrams: 


 02/12/19 05:31





 02/12/19 05:31


 Troponin/CKMB











CK-MB (CK-2)  2.0 ng/mL (0-6.6)   02/07/19  12:55    


 


Troponin I  0.660 ng/mL (< 0.028)  H*  02/09/19  02:48    














- Assessment/Plan





1. SVT/Afib: remains in SR; . continue betablockers, Multaq and Eliquis 2.5mg 

BID. 


2. CAD, s/p CABG with LIMA-LAD, SVG-OM1 and OM2 - on BBlocker, ASA, and 

Corestor 40mg qd. 


3. DM: hypoglycemic earlier. oterwise reasonable control. by PCP


4. HTN: Metoprolol was increased to 75mg BID from this AM. 


5. Dyslipidemia: on Statin


6. renal insufficiency: slightly improving; per primary or renal.


7. Seizure disorder.


8. Anemia. Likely multifactorial.





* Echo: EF 50-55%,diastolic dysfunction. mild MR,TR,PI


Pt. seen and eval. by me. I agree with the A/P by the NP.Chest clear. RRR.





Review of Systems





- Review of Systems


Constitutional: reports: no symptoms reported


EENTM: reports: no symptoms reported


Respiratory: reports: no symptoms reported


Cardiac (ROS): reports: no symptoms reported


ABD/GI: reports: no symptoms reported


: reports: no symptoms reported


Musculoskeletal: reports: no symptoms reported

## 2019-02-13 NOTE — DIS
DATE OF ADMISSION:  02/07/2019



DATE OF DISCHARGE:  02/13/2019



PRIMARY CARE PROVIDER:  Dr. Crispin Powell.



DISCHARGE DIAGNOSES:  

1. Bilateral pneumonia.

2. Physical deconditioning.

3. Atrial fibrillation with rapid ventricular response.

4. Multifocal atrial tachycardia.

5. Chronic kidney disease, stage 4.



CONDITION:  Condition of the patient on the day of discharge:  Stable.  I assessed

Ms. Arthur on the day of discharge.  She denies any chest pain or shortness of

breath.  She feels tired.  Vital signs are stable.  S1 and S2 are heard, regular.

Lungs are clear to auscultation bilaterally. 



DISCHARGE MEDICATIONS:  

1. Aspirin 81 mg daily.

2. Lexapro 10 mg daily.

3. Toviaz 8 mg daily.

4. Phenobarbital __________ two times a day.

5. Crestor 40 mg daily.

6. Omnicef 300 mg two times a day for one more week.

7. Apixaban 2.5 mg two times a day.

8. Digoxin 0.125 mg daily.

9. Multaq 400 mg two times a day.

10. Glyburide 2.5 mg daily.

11. Hydralazine 25 mg three times a day.

12. Lopressor 100 mg two times a day.

13. Torsemide 5 mg two times a day.

14. Melatonin 3 mg at bedtime as needed.



CONSULTATIONS DURING THIS HOSPITALIZATION:  Cardiology, Dr. Nickerson.



HOSPITAL COURSE:  Ms. Arthur is a pleasant 85-year-old lady, who was admitted to

[facility] on February 7, 2019, for bilateral pneumonia.

Please refer to Dr. Anthony' history and physical note dated February 7, 2019, for

further details.  She was treated with intravenous antibiotics.  She was also found

to be in atrial fibrillation with rapid ventricular response.  She was seen by

Cardiology Service.  She has been started on apixaban.  She was also found to have

runs of multifocal atrial tachycardia.  She was also started on digoxin.  A 2D

echocardiogram was a technically difficult examination, left ventricular ejection

fraction was estimated at 50% to 55%.  She had E/A flow reversal, suggestive of

diastolic dysfunction.  She had mildly dilated left atrium, mild mitral

regurgitation, mild tricuspid regurgitation, and mild pulmonic regurgitation. 



She continued to improve with antibiotics.  Chest x-ray on February 11th showed

chronic change of the lung parenchyma. 



She was also physically deconditioned. 



She had Benicar and metformin discontinued temporarily because of renal failure, she

is advised to have her creatinine and electrolytes checked in 3 to 5 days.  She will

also likely need insulin sliding scale at her nursing home. 



She was on glyburide 5 mg two times a day at the time of admission.  This has been

decreased to 2.5 mg daily because of episodes of hypoglycemia during this

hospitalization. 



Her antihypertensives were adjusted as well during this hospitalization. 



Many thanks for allowing me to participate in your patient's care.  Please feel free

to contact me with any questions or concerns. 



DISCHARGE DESTINATION:  [facility], from where the patient was

admitted to the hospital. 



Total amount of time spent coordinating this discharge:  33 minutes.







Job ID:  [id number]

## 2019-02-13 NOTE — PDOC.CTH
Cardiology Progress Note





- Subjective





The pt seen and examined.  No overnight events.  No cardiac complaints.    





- Objective


 Vital Signs











  Temp Pulse Resp BP Pulse Ox


 


 02/13/19 09:29   84   


 


 02/13/19 07:59  98.1 F  80  14  143/65 H  94 L


 


 02/13/19 03:07  98.3 F  73  16  140/65  94 L








 











Admit Weight                   178 lb 5 oz


 


Weight                         183 lb 9.6 oz














 











 02/12/19 02/13/19 02/14/19





 06:59 06:59 06:59


 


Intake Total 1200 360 


 


Output Total 1600 400 


 


Balance -400 -40 














- Physical Examination


General/Neuro: alert & oriented x3


Lungs: CTA


Heart: RRR


Abdomen: soft


Extremities: other: (No edema)





- Telemetry


Telemetry Rhythm: SR 





- Labs


Result Diagrams: 


 02/13/19 13:34





 02/13/19 13:34


 Troponin/CKMB











CK-MB (CK-2)  2.0 ng/mL (0-6.6)   02/07/19  12:55    


 


Troponin I  0.660 ng/mL (< 0.028)  H*  02/09/19  02:48    














- Assessment/Plan





1. SVT/Afib: remains in SR; . continue betablockers, Multaq and Eliquis 2.5mg 

BID. 


2. CAD, s/p CABG with LIMA-LAD, SVG-OM1 and OM2 - on BBlocker, ASA, and 

Corestor 40mg qd. 


3. DM: hypoglycemic earlier. oterwise reasonable control. by PCP


4. HTN: stable with Metoprolol 75mg BID.  


5. Dyslipidemia: on Statin


6. renal insufficiency: slightly improving; per primary or renal.


7. Seizure disorder.


8. Anemia. Likely multifactorial.





* Echo: EF 50-55%,diastolic dysfunction. mild MR,TR,PI


* From cardiac standpoint, the pt is stable to d/c home when her next SBP <140.

  The pt will f/u with Dr Hart' office within 2wks.  





<addendum>


* Benicar and Metformin were held from this AM for elevated Cr level.


* Increase metoprolol to 100mg BID. Change Hydralazine to 25mg TID.  





pt. seen and eval. by me. I agree with the a/P y theNP. Chest clear. RRR. 

Overall cardiac status is stable. I will sign off.





Review of Systems





- Review of Systems


Constitutional: reports: no symptoms reported


EENTM: reports: no symptoms reported


Respiratory: reports: no symptoms reported


Cardiac (ROS): reports: no symptoms reported


ABD/GI: reports: no symptoms reported


: reports: no symptoms reported


Musculoskeletal: reports: no symptoms reported

## 2019-02-13 NOTE — EKG
Test Reason : 

Blood Pressure : ***/*** mmHG

Vent. Rate : 102 BPM     Atrial Rate : 102 BPM

   P-R Int : 000 ms          QRS Dur : 092 ms

    QT Int : 332 ms       P-R-T Axes : [phone] degrees

   QTc Int : 432 ms

 

Sinus tachycardia with Premature atrial complexes

Nonspecific ST abnormality

Abnormal ECG

When compared with ECG of 08-FEB-2019 08:35,

Premature atrial complexes are now Present

Confirmed by DR. REJI TREJO ([id number]) on 2/13/2019 12:45:06 PM

 

Referred By:             Confirmed By:DR. REJI TREJO

## 2021-01-22 NOTE — CT
PRELIMINARY REPORT/[facility]/EMERGENTY AFTER-HOURS PROCEDURE 

 

CT Abdomen and Pelvis With Contrast

 

EXAM DATE/TIME:

2019 1:08 AM

 

CLINICAL HISTORY:

84 years old, female; Pain; Abdominal pain; Epigastric; Prior surgery; Patient HX: Er 9; Abdominal pa
in (epigastric); Patient presents with acute onset diarrhea, non bloody starting around 5:30 this tre
hetal after eating subway. Patient reports that last night, stomach not feeling well however

resolved and was absent during the day. Patient reports that she is a nursing home resident and that 
other people in the nursing home have had diarrhea. Surgical history of appendectomy, surgical histor
y of cholecystectomy, surgical history of  section.

 

TECHNIQUE:

Axial computed tomography images of the abdomen and pelvis with intravenous contrast.

Coronal reformatted images were created and reviewed.

 

COMPARISON:

No relevant prior studies available.

 

FINDINGS:

Lower thorax: There is subpleural atelectasis of the dependent portions of the lungs.

 

ABDOMEN:

Liver: Normal. No mass.

Gallbladder and bile ducts: No gallbladder is identified. There is a mild, expected degree of intrahe
patic and common bile duct dilation.

Pancreas: The pancreas appears normal. No ductal dilatation.

Spleen: The spleen is normal.

Adrenals: The adrenal glands are normal.

Kidneys and ureters: There are several large RIGHT renal exophytic cysts measuring up to 4.4 cm and 5
.6 cm from the lower pole and interpolar regions respectively. There is a simple cyst in the left kid
paz.

Stomach and bowel: The stomach is normal. The duodenum is unremarkable. Moderate diverticulosis is pr
esent in the sigmoid and descending colon. No bowel wall thickening to suggest colitis at this time.

Appendix: No evidence of appendicitis.

 

PELVIS:

Bladder: The bladder is normal.

Reproductive: The uterus is normal.

 

ABDOMEN and PELVIS:

Intraperitoneal space: Normal. No free air. No significant fluid collection.

Bones/joints: There are sternal wires consistent with previous sternotomy incision.

Soft tissues: There is a small amount of colon seen within the ventral abdominal incisional hernia. N
o obstruction. There is surgical mesh along the anterior abdominal wall.

Vasculature: The vasculature demonstrates diffuse moderate atherosclerotic calcification.

Lymph nodes: Normal. No enlarged lymph nodes.

 

IMPRESSION:

1. There is a small segment of colon seen within the ventral abdominal incisional hernia. No obstruct
ion.

2. No bowel wall thickening to suggest colitis at this time.

 

Thank you for allowing us to participate in the care of your patient.

Dictated and Authenticated by: Mark Anthony Peacock MD

2019 1:53 AM Central Time ([place] & [place])

 

 

FINAL REPORT

CT ABDOMEN AND PELVIS WITH IV CONTRAST PERFORMED ON AN EMERGENCY BASIS:

 

Date:  19

Time:  0112 hours

 

HISTORY:  

Abdominal pain. 

 

FINDINGS/IMPRESSION: 

Findings agree with the preliminary report by [facility]. Nonobstructed small bowel protrudes through an um
bilical hernia where there is evidence of prior surgery. No evidence of bowel obstruction. Prominent 
arterial calcification. 

 

 

 

POS: TPC clear